# Patient Record
Sex: FEMALE | Race: WHITE
[De-identification: names, ages, dates, MRNs, and addresses within clinical notes are randomized per-mention and may not be internally consistent; named-entity substitution may affect disease eponyms.]

---

## 2018-01-22 ENCOUNTER — HOSPITAL ENCOUNTER (OUTPATIENT)
Dept: HOSPITAL 45 - C.ACU | Age: 68
Discharge: HOME | End: 2018-01-22
Attending: SURGERY
Payer: COMMERCIAL

## 2018-01-22 VITALS
DIASTOLIC BLOOD PRESSURE: 76 MMHG | SYSTOLIC BLOOD PRESSURE: 137 MMHG | TEMPERATURE: 98.6 F | OXYGEN SATURATION: 93 % | HEART RATE: 86 BPM

## 2018-01-22 VITALS
OXYGEN SATURATION: 94 % | TEMPERATURE: 97.52 F | DIASTOLIC BLOOD PRESSURE: 70 MMHG | SYSTOLIC BLOOD PRESSURE: 126 MMHG | HEART RATE: 89 BPM

## 2018-01-22 VITALS — DIASTOLIC BLOOD PRESSURE: 66 MMHG | SYSTOLIC BLOOD PRESSURE: 122 MMHG | HEART RATE: 99 BPM | OXYGEN SATURATION: 92 %

## 2018-01-22 VITALS
SYSTOLIC BLOOD PRESSURE: 120 MMHG | TEMPERATURE: 98.06 F | OXYGEN SATURATION: 93 % | DIASTOLIC BLOOD PRESSURE: 78 MMHG | HEART RATE: 85 BPM

## 2018-01-22 VITALS
HEIGHT: 62.99 IN | BODY MASS INDEX: 35.51 KG/M2 | BODY MASS INDEX: 35.51 KG/M2 | WEIGHT: 200.42 LBS | HEIGHT: 62.99 IN | WEIGHT: 200.42 LBS

## 2018-01-22 DIAGNOSIS — F32.3: ICD-10-CM

## 2018-01-22 DIAGNOSIS — E55.9: ICD-10-CM

## 2018-01-22 DIAGNOSIS — K43.9: Primary | ICD-10-CM

## 2018-01-22 NOTE — OPERATIVE REPORT
DATE OF OPERATION:  01/22/2018

 

PREOPERATIVE DIAGNOSIS:  Ventral hernia.

 

POSTOPERATIVE DIAGNOSIS:  Ventral hernia.

 

OPERATION:  Open repair of ventral hernia with mesh.

 

SURGEON:  Natacha Short MD.

 

ANESTHESIA:  General.

 

FIRST ASSISTANT:  Kellen Hidalgo PA-C.

 

FINDINGS:  Ventral hernia, size is 2.5 x 2.5 cm.

 

COMPLICATIONS:  None.

 

INDICATIONS FOR THE PROCEDURE:  This is a 67-year-old female who presented

with symptomatic ventral hernia.  The patient will be required to do ventral

hernia repair, possible mesh.  I did talk to the patient about the benefit

and risk, alternate procedure.  I indicated the risks may include but not

limited such as bleeding, infection, hernia recurrence, injury to bowel,

complications related to mesh.  The patient understands.  She signed informed

consent and I answered all questions.

 

DETAILS OF PROCEDURE:  We brought the patient to the OR, put the patient in

the supine position.  The patient received SCD on bilateral legs to prevent

DVT.  Also, the patient received 600 mg of clindamycin IV for prophylactic

antibiotic.  The patient received general anesthesia without difficulty.  The

abdomen was prepped and draped in routine sterile fashion.  After time out,

we made a midline incision just above the umbilicus about 4 cm and then

deepened through the subcutaneous layer and then we found the patient had a

ventral hernia.  The hernia size was about 2.5 x 2.5.  We successfully

completely reduced the hernia contents back to the abdominal cavity,

mobilized the fascial layer and then we decided to use 6.4 x 6.4 cm mesh to

repair the hernia.  Then I chose #1 Ethibond and sutured the fascia to the

mesh around 360 degree interruptedly and then we tied the suture.  The mesh

seated nicely, no tension and then I closed the subcutaneous layer by using

2-0 Vicryl interruptedly and closed skin by using staple.  Then the injected

the local anesthesia by using 1% lidocaine mixed with 0.5% Marcaine around

the incision.  We put the dressing on.  The patient tolerated the procedure

well.  All the instrument, needle and sponge count were correct x2 at the end

of case and patient transferred to recovery room in stable condition.

 

 

I attest to the content of the Intraoperative Record and any orders documented 
therein. Any exceptions are noted below.

 

 

 

ANYA

## 2018-01-22 NOTE — ANESTHESIOLOGY PROGRESS NOTE
Anesthesia Post Op Note


Date & Time


Jan 22, 2018 at 10:11





Vital Signs


Pain Intensity:  5.0





Vital Signs Past 12 Hours








  Date Time  Temp Pulse Resp B/P (MAP) Pulse Ox O2 Delivery O2 Flow Rate FiO2


 


1/22/18 09:52  92 17  96   


 


1/22/18 09:52  86 17     


 


1/22/18 09:51    126/85    


 


1/22/18 09:47  95 18  97   


 


1/22/18 09:47  83 18     


 


1/22/18 09:46    130/86    


 


1/22/18 09:42  99 18     


 


1/22/18 09:42  101 18  96   


 


1/22/18 09:41    132/94    


 


1/22/18 09:37  102 20  97   


 


1/22/18 09:37  98 20     


 


1/22/18 09:36    155/89    


 


1/22/18 09:32  113 17     


 


1/22/18 09:32  113 17  98   


 


1/22/18 09:31    140/102    


 


1/22/18 09:27  114 20 134/101 97   


 


1/22/18 09:27  114 20     


 


1/22/18 09:27 36.8 110 19 134/107 96 Oxymask 11 


 


1/22/18 07:31 37 86 20 137/76 (96) 93 Room Air  











Notes


Mental Status:  alert / awake / arousable, participated in evaluation


Pt Amnestic to Procedure:  Yes


Nausea / Vomiting:  adequately controlled


Pain:  adequately controlled


Airway Patency, RR, SpO2:  stable & adequate


BP & HR:  stable & adequate


Hydration State:  stable & adequate


Anesthetic Complications:  no major complications apparent

## 2018-01-22 NOTE — DISCHARGE INSTRUCTIONS
Discharge Instructions


Date of Service


Jan 22, 2018.





Admission


Reason for Admission:  Ventral Hernia





Discharge


Discharge Diagnosis / Problem:  same





Discharge Goals


Goal(s):  Decrease discomfort, Improve function





Activity Recommendations


Activity Limitations:  as noted below


No heavy lifting over 10 pounds for 4-6 weeks


No strenuous activity until cleared by surgeon


No submerging incisions underwater for 2 weeks (no bathing, swimming, or hot 

tubs)


No driving while taking narcotic pain medication or until you are pain free


.





Instructions / Follow-Up


Instructions / Follow-Up


You may shower in 4 days, and then remove outer dressing.  Sponge bath and wash 

hair in meantime.


You have surgical staples that will be removed in surgical office


Please cover daily with gauze


Wear abdominal binder daily for support and compression.  You may take this off 

at bedtime. 


You will be given Narcotic pain medication (Percocet) as needed for moderate to 

severe pain.  You may take Tylenol/Ibuprofen as needed for mild pain.  This 

medication may make you drowsy. Take as directed.


Follow-up in surgical office in 1-2 weeks as scheduled , please call office at 

[phone] if you do not already have an appointment made.





Current Hospital Diet


Patient's current hospital diet:





Discharge Diet


Recommended Diet:  Regular Diet





Procedures


Procedures Performed:  


Open Repair Ventral Hernia with Insertion of Mesh





Pending Studies


Studies pending at discharge:  no





Medical Emergencies








.


Who to Call and When:





Medical Emergencies:  If at any time you feel your situation is an emergency, 

please call 911 immediately.





.





Non-Emergent Contact


Non-Emergency issues call your:  Primary Care Provider, Surgeon


Call Non-Emergent contact if:  you have a fever, temperature is above 101, your 

pain is not controlled, your pain is worsening, your pain is unusual for you, 

wound has increased drainage, wound has increased redness, wound has increased 

pain


.








"Provider Documentation" section prepared by Kellen Hidalgo.








.





VTE Core Measure


Inpt VTE Proph given/why not?:  SCD's





[place] Drug Monitoring Program


Search Results:  patient reviewed within database, no issues identified

## 2018-01-22 NOTE — [facility] POST OPERATIVE BRIEF NOTE
Immediate Operative Summary


Operative Date


Jan 22, 2018.





Pre-Operative Diagnosis





Ventral Hernia





Post-Operative Diagnosis





Ventral Hernia





Procedure(s) Performed





open repair ventral hernia with mesh





Surgeon


Dr. Short





Assistant Surgeon(s)


Kellen Hidalgo PA-C





Estimated Blood Loss


5 ml





Findings


Consistent with Post-Op Diagnosis





Fluids (cc crystalloids)


1100ml





Specimens





none





Drains


None





Anesthesia Type


General





Complication(s)


none





Disposition


Accompanied Pt To Recover:  yes


Disposition:  Recovery Room / PACU